# Patient Record
Sex: FEMALE | Race: OTHER | NOT HISPANIC OR LATINO | URBAN - METROPOLITAN AREA
[De-identification: names, ages, dates, MRNs, and addresses within clinical notes are randomized per-mention and may not be internally consistent; named-entity substitution may affect disease eponyms.]

---

## 2023-09-23 ENCOUNTER — EMERGENCY (EMERGENCY)
Facility: HOSPITAL | Age: 33
LOS: 1 days | Discharge: HOME | End: 2023-09-23
Attending: EMERGENCY MEDICINE | Admitting: EMERGENCY MEDICINE
Payer: SELF-PAY

## 2023-09-23 VITALS
SYSTOLIC BLOOD PRESSURE: 143 MMHG | OXYGEN SATURATION: 99 % | RESPIRATION RATE: 16 BRPM | HEART RATE: 150 BPM | TEMPERATURE: 98 F | DIASTOLIC BLOOD PRESSURE: 79 MMHG

## 2023-09-23 DIAGNOSIS — R00.0 TACHYCARDIA, UNSPECIFIED: ICD-10-CM

## 2023-09-23 DIAGNOSIS — I45.10 UNSPECIFIED RIGHT BUNDLE-BRANCH BLOCK: ICD-10-CM

## 2023-09-23 DIAGNOSIS — F10.120 ALCOHOL ABUSE WITH INTOXICATION, UNCOMPLICATED: ICD-10-CM

## 2023-09-23 DIAGNOSIS — F12.120 CANNABIS ABUSE WITH INTOXICATION, UNCOMPLICATED: ICD-10-CM

## 2023-09-23 DIAGNOSIS — R41.82 ALTERED MENTAL STATUS, UNSPECIFIED: ICD-10-CM

## 2023-09-23 PROCEDURE — 99223 1ST HOSP IP/OBS HIGH 75: CPT

## 2023-09-23 RX ORDER — MIDAZOLAM HYDROCHLORIDE 1 MG/ML
5 INJECTION, SOLUTION INTRAMUSCULAR; INTRAVENOUS ONCE
Refills: 0 | Status: DISCONTINUED | OUTPATIENT
Start: 2023-09-23 | End: 2023-09-23

## 2023-09-23 RX ORDER — SODIUM CHLORIDE 9 MG/ML
1000 INJECTION INTRAMUSCULAR; INTRAVENOUS; SUBCUTANEOUS ONCE
Refills: 0 | Status: COMPLETED | OUTPATIENT
Start: 2023-09-23 | End: 2023-09-23

## 2023-09-23 RX ORDER — MIDAZOLAM HYDROCHLORIDE 1 MG/ML
3 INJECTION, SOLUTION INTRAMUSCULAR; INTRAVENOUS ONCE
Refills: 0 | Status: DISCONTINUED | OUTPATIENT
Start: 2023-09-23 | End: 2023-09-23

## 2023-09-23 RX ADMIN — SODIUM CHLORIDE 1000 MILLILITER(S): 9 INJECTION INTRAMUSCULAR; INTRAVENOUS; SUBCUTANEOUS at 21:23

## 2023-09-23 RX ADMIN — MIDAZOLAM HYDROCHLORIDE 5 MILLIGRAM(S): 1 INJECTION, SOLUTION INTRAMUSCULAR; INTRAVENOUS at 23:24

## 2023-09-23 RX ADMIN — Medication 1 MILLIGRAM(S): at 21:20

## 2023-09-23 RX ADMIN — Medication 1 MILLIGRAM(S): at 21:44

## 2023-09-23 NOTE — ED ADULT NURSE NOTE - NSFALLRISKINTERV_ED_ALL_ED
Assistance OOB with selected safe patient handling equipment if applicable/Assistance with ambulation/Communicate fall risk and risk factors to all staff, patient, and family/Monitor gait and stability/Monitor for mental status changes and reorient to person, place, and time, as needed/Provide visual cue: yellow wristband, yellow gown, etc/Reinforce activity limits and safety measures with patient and family/Toileting schedule using arm’s reach rule for commode and bathroom/Use of alarms - bed, stretcher, chair and/or video monitoring/Call bell, personal items and telephone in reach/Instruct patient to call for assistance before getting out of bed/chair/stretcher/Non-slip footwear applied when patient is off stretcher/[place] to call system/Physically safe environment - no spills, clutter or unnecessary equipment/Purposeful Proactive Rounding/Room/bathroom lighting operational, light cord in reach

## 2023-09-23 NOTE — ED PROVIDER NOTE - CLINICAL SUMMARY MEDICAL DECISION MAKING FREE TEXT BOX
33-year-old female with unknown past medical history brought in by EMS for alcohol and cannabis intoxication.  EMS reports patient endorsed alcohol and cannabis use. Patient states that she hadn't smoked or drank in over a year, but did tonight.     On exam, patient is clinically intoxicated, tearful, anxious with alcohol on breath with response to verbal and physical stimulation.  Pupils equal round and reactive to light.  Nonlabored respirations.  No external signs of trauma to the head or extremities appreciated.    Patient brought in by EMS severely intoxicated.  Will observe in the emergency department until clinically sober and safe for discharge. Very anxious and tearful and will provide anxiolysis.  Fingerstick within normal limits.  No indication for imaging or other labs at this time.

## 2023-09-23 NOTE — ED PROVIDER NOTE - OBJECTIVE STATEMENT
33-year-old female with unknown past medical history brought in by EMS for alcohol and cannabis intoxication.  EMS reports patient endorsed alcohol and cannabis use. Patient states that she hadn't smoked or drank in over a year, but did tonight.
DM (diabetes mellitus)

## 2023-09-23 NOTE — ED PROVIDER NOTE - PROGRESS NOTE DETAILS
Patient continues to be very anxious and tearful. Appears that she is on clonazepam as outpatient and likely has higher tolerance to benzos. Will order versed now.

## 2023-09-23 NOTE — ED PROVIDER NOTE - NSFOLLOWUPINSTRUCTIONS_ED_ALL_ED_FT
PLEASE FOLLOW-UP WITH YOUR PRIMARY CARE DOCTOR IN 1-2 DAYS FOR FURTHER EVALUATION.      PLEASE TAKE ALL PAPERWORK FROM TODAY'S VISIT TO YOUR PRIMARY DOCTOR.     IF YOU DO NOT HAVE A PRIMARY CARE DOCTOR PLEASE REFER TO THE OFFICE/CLINIC INFORMATION GIVEN BELOW:    If you do not have a doctor, you can call our referral line to find a doctor that matches your insurance; the number is [phone].     You can also follow up with clinics listed below, if you do not have a doctor:  [address] [address]  To make an appointment, call [phone]    [facility]  Address: [address] [place] [address]  Appointment Center: [phone] ([phone])     PLEASE RETURN TO THE ER IMMEDIATELY OR CALL [phone] ANY HIGH FEVER, CHEST PAIN, TROUBLE BREATHING, VOMITING, SEVERE PAIN, OR ANY OTHER CONCERNS.

## 2023-09-23 NOTE — ED CDU PROVIDER INITIAL DAY NOTE - OBJECTIVE STATEMENT
33-year-old female with unknown past medical history brought in by EMS for alcohol and cannabis intoxication.  EMS reports patient endorsed alcohol and cannabis use. Patient states that she hadn't smoked or drank in over a year, but did tonight.

## 2023-09-23 NOTE — ED ADULT TRIAGE NOTE - CHIEF COMPLAINT QUOTE
patient BIBA on street for AMS; admits to smoking weed and ETOH this evening; slurrign speech and crying in triage; tachycardia noted; denyin chest pain or SOB

## 2023-09-23 NOTE — ED CDU PROVIDER INITIAL DAY NOTE - NSFOLLOWUPINSTRUCTIONS_ED_ALL_ED_FT
PLEASE FOLLOW-UP WITH YOUR PRIMARY CARE DOCTOR IN 1-2 DAYS FOR FURTHER EVALUATION.      PLEASE TAKE ALL PAPERWORK FROM TODAY'S VISIT TO YOUR PRIMARY DOCTOR.     IF YOU DO NOT HAVE A PRIMARY CARE DOCTOR PLEASE REFER TO THE OFFICE/CLINIC INFORMATION GIVEN BELOW:    If you do not have a doctor, you can call our referral line to find a doctor that matches your insurance; the number is [phone].     You can also follow up with clinics listed below, if you do not have a doctor:  [address] [address]  To make an appointment, call [phone]    [facility]  Address: [address] [place] [address]  Appointment Center: [phone] ([phone])     PLEASE RETURN TO THE ER IMMEDIATELY OR CALL [phone] ANY HIGH FEVER, CHEST PAIN, TROUBLE BREATHING, VOMITING, SEVERE PAIN, OR ANY OTHER CONCERNS.

## 2023-09-23 NOTE — ED ADULT NURSE NOTE - OBJECTIVE STATEMENT
Pt is a 33y female c/o AMS. Pt admits to ETOH and marijuana use. Pt with slurred speech. Respirations even and unlabored. Disoriented. Able to be redirected. Further history limited due to AMS. Safety precautions in place.

## 2023-09-23 NOTE — ED PROVIDER NOTE - PHYSICAL EXAMINATION
On exam, patient is clinically intoxicated, tearful, anxious with alcohol on breath with response to verbal and physical stimulation.  Pupils equal round and reactive to light.  Nonlabored respirations.  No external signs of trauma to the head or extremities appreciated.

## 2023-09-24 VITALS
OXYGEN SATURATION: 99 % | RESPIRATION RATE: 18 BRPM | DIASTOLIC BLOOD PRESSURE: 71 MMHG | TEMPERATURE: 99 F | SYSTOLIC BLOOD PRESSURE: 115 MMHG | HEART RATE: 104 BPM

## 2023-09-24 NOTE — ED ADULT NURSE REASSESSMENT NOTE - GENERAL PATIENT STATE
comfortable appearance/drowsy/no change observed/resting/sleeping
comfortable appearance/cooperative/drowsy/smiling/interactive

## 2023-09-24 NOTE — ED CDU PROVIDER DISPOSITION NOTE - CLINICAL COURSE
Patient was able to contact family who has called a car service for her. She was walked out of the ED to ensure she was able to get into the car safely to take her home. Patient AAOx3, clear speech, steady gait, appropriate for discharge, counseled extensively on alcohol use and abuse, does not want detox at this time.

## 2023-09-24 NOTE — ED ADULT NURSE REASSESSMENT NOTE - NS ED NURSE REASSESS COMMENT FT1
pt awake to use bathroom; noted unsteady gait, this RN assisted pt all the way; pt apologetic for current disposition and thankful for ED staff; pt reassured, this RN reinforced plan of care, pt agreeable; provided drinking water per request; vital signs rechecked, stable; pt resumes resting in bed with eyes closed

## 2023-09-24 NOTE — ED CDU PROVIDER DISPOSITION NOTE - NSFOLLOWUPINSTRUCTIONS_ED_ALL_ED_FT
PLEASE FOLLOW-UP WITH YOUR PRIMARY CARE DOCTOR IN 1-2 DAYS FOR FURTHER EVALUATION.      PLEASE TAKE ALL PAPERWORK FROM TODAY'S VISIT TO YOUR PRIMARY DOCTOR.     IF YOU DO NOT HAVE A PRIMARY CARE DOCTOR PLEASE REFER TO THE OFFICE/CLINIC INFORMATION GIVEN BELOW:    If you do not have a doctor, you can call our referral line to find a doctor that matches your insurance; the number is [phone].     You can also follow up with clinics listed below, if you do not have a doctor:  [address] [address]  To make an appointment, call [phone]    [facility]  Address: [address] [place] [address]  Appointment Center: [phone])     PLEASE RETURN TO THE ER IMMEDIATELY OR CALL [phone] ANY HIGH FEVER, CHEST PAIN, TROUBLE BREATHING, VOMITING, SEVERE PAIN, OR ANY OTHER CONCERNS.

## 2023-09-24 NOTE — ED ADULT NURSE REASSESSMENT NOTE - COMFORT CARE
bed alarm checked/repositioned/side rails up
assisted to bathroom/po fluids offered/repositioned/side rails up/wait time explained/warm blanket provided

## 2023-09-24 NOTE — ED CDU PROVIDER DISPOSITION NOTE - PATIENT PORTAL LINK FT
You can access the FollowMyHealth Patient Portal offered by [facility] by registering at the following website: [email or web address]. By joining [employer]’s FollowMyHealth portal, you will also be able to view your health information using other applications (apps) compatible with our system.